# Patient Record
Sex: FEMALE | URBAN - METROPOLITAN AREA
[De-identification: names, ages, dates, MRNs, and addresses within clinical notes are randomized per-mention and may not be internally consistent; named-entity substitution may affect disease eponyms.]

---

## 2021-12-01 ENCOUNTER — HOSPITAL ENCOUNTER (EMERGENCY)
Facility: HOSPITAL | Age: 26
Discharge: LEFT BEFORE DISCHARGE | End: 2021-12-01

## 2022-06-05 ENCOUNTER — APPOINTMENT (OUTPATIENT)
Dept: GENERAL RADIOLOGY | Facility: HOSPITAL | Age: 27
End: 2022-06-05
Attending: EMERGENCY MEDICINE
Payer: MEDICAID

## 2022-06-05 PROCEDURE — 71045 X-RAY EXAM CHEST 1 VIEW: CPT | Performed by: EMERGENCY MEDICINE
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
## 2022-06-06 NOTE — ED NOTES
This writer received a phone call from [facility]. [place] [address] only received patients GenExpert PCR test; asked to refax labs; especially pregnancy test.    Packet is under review.

## 2022-06-06 NOTE — ED NOTES
Pt was assessed and recommended inpatient admission. Pt needs transfer out d/t her insurance is OON with Sakina Montemayor. Pt presented as ox2 (self and time). The petition was activated and paperwork was sent to the [place] [address]. Pt was provided copies.

## 2022-06-06 NOTE — ED QUICK NOTES
RN attempted to give RN to RN report at [facility]. Report not taken at this time d/t labs not being faxed.  [facility] notified
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
## 2022-06-06 NOTE — ED NOTES
This writer contacted Jenna of [facility] faxed referral over d/t them not having any available beds. Patients packet is under review.
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
## 2022-06-06 NOTE — ED NOTES
This writer received an incoming phone call from [address] [place] [address].  Patients accepted under Dr. Asher Sanchez. Transport to be set up during nurse to nurse at phone number [phone].

## 2022-06-06 NOTE — ED NOTES
This writer contacted [facility] regarding bed status. [facility] has beds but they will be used by patients in the Emergency department.     This writer was asked to call back tomorrow after 11:00 am.

## 2022-06-06 NOTE — ED QUICK NOTES
Consent obtained and completed for Medical Forensic Exam and Evidence Collection, Photographic Evidence, and Consent for Toxicology Testing.

## 2022-06-06 NOTE — ED NOTES
TRANSFER SUMMARY:  ANNA GONZALEZ:  Faxed packet for review. [facility]: Jignesh gonzalez will send packet over to [facility] or [facility].    NO AVAILABLE BEDS:  [facility]:  No available beds; try back tomorrow after 11:00 am.  [facility]:  No available beds. Call back around 1:00 pm for possible discharges. [facility]: No available beds; gave away their last women's; bed.
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
## 2022-06-06 NOTE — PROGRESS NOTES
06/05/22 9104   Advance Directives for Healthcare   Does the patient have:  None   Healthcare Agent Appointed No
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
## 2022-06-06 NOTE — ED QUICK NOTES
Patients parents came back to the room and report they are concerned about her drug use and would like her assessed by guilherme oak to see if she would need inpatient rehabe.

## 2022-06-06 NOTE — ED NOTES
This writer contacted [address] [place] [address] to confirm receipt of patients referral.  This writer confirmed that patients referral was sent.     Sinan Whitfield will look out for patients referral.
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
## 2022-06-06 NOTE — ED QUICK NOTES
Nurse to nurse report given to Suyapa King RN at [facility].  Per Suyapa King RN they will call back when they have an accepting time     Accepting MD -- Dr. Herlinda Lomeli

## 2022-06-06 NOTE — ED NOTES
This writer contacted [address] [place] [address] regarding status of patients referral.  [address] [place] [address] does not have any beds at this time. Patients packet was faxed over to [facility] for review. Packet is under review with [facility].

## 2022-10-09 ENCOUNTER — HOSPITAL ENCOUNTER (EMERGENCY)
Facility: HOSPITAL | Age: 27
Discharge: LEFT BEFORE DISCHARGE | End: 2022-10-09
Payer: MEDICAID

## 2022-10-09 VITALS
SYSTOLIC BLOOD PRESSURE: 108 MMHG | OXYGEN SATURATION: 98 % | DIASTOLIC BLOOD PRESSURE: 70 MMHG | HEART RATE: 97 BPM | TEMPERATURE: 98 F | RESPIRATION RATE: 16 BRPM

## 2022-10-09 NOTE — ED INITIAL ASSESSMENT (HPI)
Pt to the ER via walk in for rash all over body. Per patient she was at the  this morning and then for came home to see bumps all over her body. Rash throughout body. Pt complaints of itching.

## 2024-09-25 ENCOUNTER — APPOINTMENT (OUTPATIENT)
Dept: ULTRASOUND IMAGING | Facility: HOSPITAL | Age: 29
End: 2024-09-25
Attending: EMERGENCY MEDICINE
Payer: MEDICAID

## 2024-09-25 ENCOUNTER — HOSPITAL ENCOUNTER (EMERGENCY)
Facility: HOSPITAL | Age: 29
Discharge: HOME | End: 2024-09-25
Attending: EMERGENCY MEDICINE
Payer: MEDICAID

## 2024-09-25 ENCOUNTER — APPOINTMENT (OUTPATIENT)
Dept: GENERAL RADIOLOGY | Facility: HOSPITAL | Age: 29
End: 2024-09-25
Attending: EMERGENCY MEDICINE
Payer: MEDICAID

## 2024-09-25 VITALS
TEMPERATURE: 99 F | SYSTOLIC BLOOD PRESSURE: 110 MMHG | HEART RATE: 64 BPM | WEIGHT: 135 LBS | BODY MASS INDEX: 21 KG/M2 | DIASTOLIC BLOOD PRESSURE: 65 MMHG | RESPIRATION RATE: 16 BRPM | OXYGEN SATURATION: 100 %

## 2024-09-25 DIAGNOSIS — R06.00 DYSPNEA, UNSPECIFIED TYPE: Primary | ICD-10-CM

## 2024-09-25 LAB
ALBUMIN SERPL-MCNC: 4.1 G/DL (ref 3.2–4.8)
ALBUMIN/GLOB SERPL: 1.5 {RATIO} (ref 1–2)
ALP LIVER SERPL-CCNC: 59 U/L
ALT SERPL-CCNC: 22 U/L
ANION GAP SERPL CALC-SCNC: 6 MMOL/L (ref 0–18)
AST SERPL-CCNC: 18 U/L (ref ?–34)
BASOPHILS # BLD AUTO: 0.01 X10(3) UL (ref 0–0.2)
BASOPHILS NFR BLD AUTO: 0.2 %
BILIRUB SERPL-MCNC: 1.2 MG/DL (ref 0.3–1.2)
BUN BLD-MCNC: 10 MG/DL (ref 9–23)
CALCIUM BLD-MCNC: 9.5 MG/DL (ref 8.7–10.4)
CHLORIDE SERPL-SCNC: 108 MMOL/L (ref 98–112)
CO2 SERPL-SCNC: 20 MMOL/L (ref 21–32)
CREAT BLD-MCNC: 0.75 MG/DL
D DIMER PPP FEU-MCNC: 0.86 UG/ML FEU (ref ?–0.5)
EGFRCR SERPLBLD CKD-EPI 2021: 110 ML/MIN/1.73M2 (ref 60–?)
EOSINOPHIL # BLD AUTO: 0.03 X10(3) UL (ref 0–0.7)
EOSINOPHIL NFR BLD AUTO: 0.5 %
ERYTHROCYTE [DISTWIDTH] IN BLOOD BY AUTOMATED COUNT: 16.2 %
FLUAV + FLUBV RNA SPEC NAA+PROBE: NEGATIVE
FLUAV + FLUBV RNA SPEC NAA+PROBE: NEGATIVE
GLOBULIN PLAS-MCNC: 2.8 G/DL (ref 2–3.5)
GLUCOSE BLD-MCNC: 88 MG/DL (ref 70–99)
HCT VFR BLD AUTO: 32.7 %
HGB BLD-MCNC: 11.5 G/DL
IMM GRANULOCYTES # BLD AUTO: 0.02 X10(3) UL (ref 0–1)
IMM GRANULOCYTES NFR BLD: 0.3 %
LYMPHOCYTES # BLD AUTO: 2.75 X10(3) UL (ref 1–4)
LYMPHOCYTES NFR BLD AUTO: 43.7 %
MCH RBC QN AUTO: 28.7 PG (ref 26–34)
MCHC RBC AUTO-ENTMCNC: 35.2 G/DL (ref 31–37)
MCV RBC AUTO: 81.5 FL
MONOCYTES # BLD AUTO: 0.41 X10(3) UL (ref 0.1–1)
MONOCYTES NFR BLD AUTO: 6.5 %
NEUTROPHILS # BLD AUTO: 3.08 X10 (3) UL (ref 1.5–7.7)
NEUTROPHILS # BLD AUTO: 3.08 X10(3) UL (ref 1.5–7.7)
NEUTROPHILS NFR BLD AUTO: 48.8 %
OSMOLALITY SERPL CALC.SUM OF ELEC: 276 MOSM/KG (ref 275–295)
PLATELET # BLD AUTO: 294 10(3)UL (ref 150–450)
POTASSIUM SERPL-SCNC: 3.7 MMOL/L (ref 3.5–5.1)
PROT SERPL-MCNC: 6.9 G/DL (ref 5.7–8.2)
RBC # BLD AUTO: 4.01 X10(6)UL
RSV RNA SPEC NAA+PROBE: NEGATIVE
SARS-COV-2 RNA RESP QL NAA+PROBE: NOT DETECTED
SODIUM SERPL-SCNC: 134 MMOL/L (ref 136–145)
WBC # BLD AUTO: 6.3 X10(3) UL (ref 4–11)

## 2024-09-25 PROCEDURE — 85025 COMPLETE CBC W/AUTO DIFF WBC: CPT | Performed by: EMERGENCY MEDICINE

## 2024-09-25 PROCEDURE — 36415 COLL VENOUS BLD VENIPUNCTURE: CPT

## 2024-09-25 PROCEDURE — 0241U SARS-COV-2/FLU A AND B/RSV BY PCR (GENEXPERT): CPT | Performed by: EMERGENCY MEDICINE

## 2024-09-25 PROCEDURE — 85379 FIBRIN DEGRADATION QUANT: CPT | Performed by: EMERGENCY MEDICINE

## 2024-09-25 PROCEDURE — 80053 COMPREHEN METABOLIC PANEL: CPT | Performed by: EMERGENCY MEDICINE

## 2024-09-25 PROCEDURE — 93970 EXTREMITY STUDY: CPT | Performed by: EMERGENCY MEDICINE

## 2024-09-25 PROCEDURE — 99284 EMERGENCY DEPT VISIT MOD MDM: CPT

## 2024-09-25 PROCEDURE — 71045 X-RAY EXAM CHEST 1 VIEW: CPT | Performed by: EMERGENCY MEDICINE

## 2024-09-25 PROCEDURE — 99285 EMERGENCY DEPT VISIT HI MDM: CPT

## 2024-09-25 PROCEDURE — 94640 AIRWAY INHALATION TREATMENT: CPT

## 2024-09-25 RX ORDER — ALBUTEROL SULFATE 90 UG/1
2 INHALANT RESPIRATORY (INHALATION) EVERY 4 HOURS PRN
Qty: 1 EACH | Refills: 0 | Status: SHIPPED | OUTPATIENT
Start: 2024-09-25 | End: 2024-10-25

## 2024-09-25 RX ORDER — IPRATROPIUM BROMIDE AND ALBUTEROL SULFATE 2.5; .5 MG/3ML; MG/3ML
3 SOLUTION RESPIRATORY (INHALATION) ONCE
Status: COMPLETED | OUTPATIENT
Start: 2024-09-25 | End: 2024-09-25

## 2024-09-25 NOTE — ED PROVIDER NOTES
Patient Seen in: [facility] Emergency Department      History     Chief Complaint   Patient presents with    Shortness Of Breath     SOB 7 weeks, worse this week. 6 weeks pregnant       Stated Complaint:     Subjective:   HPI    29-year-old female complaint of shortness of breath.  She says been going on for a few weeks seems to be worse this week she just found out she was pregnant a few days ago she is about 6 weeks pregnant last menstrual period was August 11.  The shortness of breath initially was just with exertion now at rest at times but worsens with exertion and sometimes being outside makes a difference.  She denies any cough cold symptoms fever heart problems history of asthma or swelling in her legs no history of pulmonary embolus or DVT.  She did feel have some wheezing after COVID in the past and was treated with albuterol is seems somewhat similar to then.  Patient does have some tingling around her lips at times.    Objective:   History reviewed. No pertinent past medical history.           History reviewed. No pertinent surgical history.             Social History     Socioeconomic History    Marital status: Single   Tobacco Use    Smoking status: Former     Types: Cigarettes    Smokeless tobacco: Never   Vaping Use    Vaping status: Some Days   Substance and Sexual Activity    Alcohol use: Not Currently    Drug use: Not Currently     Types: Cannabis              Review of Systems    Positive for stated Chief Complaint: Shortness Of Breath (SOB 7 weeks, worse this week. 6 weeks pregnant/)    Other systems are as noted in HPI.  Constitutional and vital signs reviewed.      All other systems reviewed and negative except as noted above.    Physical Exam     ED Triage Vitals [09/25/24 1831]   /69   Pulse 97   Resp 22   Temp 98.9 °F (37.2 °C)   Temp src Oral   SpO2 99 %   O2 Device None (Room air)       Current Vitals:   Vital Signs  BP: 106/63  Pulse: 67  Resp: 13  Temp: 98.9 °F (37.2  °C)  Temp src: Oral  MAP (mmHg): 76    Oxygen Therapy  SpO2: 100 %  O2 Device: None (Room air)            Physical Exam    Patient is alert orient x 3 no acute distress HEENT exam within normal limits neck there is no lymphadenopathy or JVD lungs clear cardiovascular exam shows regular rate and rhythm without murmurs abdomen soft and nontender skin is no rash neurologic Patrick is normal.    ED Course     Labs Reviewed   COMP METABOLIC PANEL (14) - Abnormal; Notable for the following components:       Result Value    Sodium 134 (*)     CO2 20.0 (*)     All other components within normal limits   CBC WITH DIFFERENTIAL WITH PLATELET - Abnormal; Notable for the following components:    HGB 11.5 (*)     HCT 32.7 (*)     All other components within normal limits   D-DIMER - Abnormal; Notable for the following components:    D-Dimer 0.86 (*)     All other components within normal limits   SARS-COV-2/FLU A AND B/RSV BY PCR (GENEXPERT) - Normal    Narrative:     This test is intended for the qualitative detection and differentiation of SARS-CoV-2, influenza A, influenza B, and respiratory syncytial virus (RSV) viral RNA in nasopharyngeal or nares swabs from individuals suspected of respiratory viral infection consistent with COVID-19 by their healthcare provider. Signs and symptoms of respiratory viral infection due to SARS-CoV-2, influenza, and RSV can be similar.    Test performed using the Xpert Xpress SARS-CoV-2/FLU/RSV (real time RT-PCR)  assay on the GeneXpert instrument, [employer], [place] [address].   This test is being used under the Food and Drug Administration's Emergency Use Authorization.    The authorized Fact Sheet for Healthcare Providers for this assay is available upon request from the laboratory.   RAINBOW DRAW LAVENDER   RAINBOW DRAW LIGHT GREEN   RAINBOW DRAW GOLD   RAINBOW DRAW BLUE             Abnormal Labs Reviewed   COMP METABOLIC PANEL (14) - Abnormal; Notable for the following components:       Result  Value    Sodium 134 (*)     CO2 20.0 (*)     All other components within normal limits   CBC WITH DIFFERENTIAL WITH PLATELET - Abnormal; Notable for the following components:    HGB 11.5 (*)     HCT 32.7 (*)     All other components within normal limits   D-DIMER - Abnormal; Notable for the following components:    D-Dimer 0.86 (*)     All other components within normal limits     D-dimer is elevated however 1 reference states this is normal for first trimester pregnancy.  XR CHEST AP PORTABLE  (CPT=71045)    Result Date: 9/25/2024  CONCLUSION:  No active disease seen.   LOCATION:  [id number]      Dictated by (CST): Abhay Pena MD on 9/25/2024 at 7:26 PM     Finalized by (CST): Abhay Pena MD on 9/25/2024 at 7:26 PM      Images independent reviewed there is no pneumonia.     Venous Doppler ultrasound was negative for DVT images independent reviewed.  I  MDM    initial differential diagnosis includes but not limited to  Asthma bronchitis pneumonia anxiety pulmonary embolus      Patient felt better after neb treatment I did not hear any clear wheezing but she did not appear dyspneic unclear if this is some bronchoconstriction or perhaps anxiety could be part of this I do not see evidence of pneumonia CHF or pulmonary embolus she appears well to be discharged her O2 sat was 100% and vital signs are stable.                             Medical Decision Making      Disposition and Plan     Clinical Impression:  1. Dyspnea, unspecified type         Disposition:  Discharge  9/25/2024  9:48 pm    Follow-up:  Kelsey Devlin  [address]  [address] [place] [address] [phone]    Follow up in 3 day(s)            Medications Prescribed:  Current Discharge Medication List        START taking these medications    Details   albuterol 108 (90 Base) MCG/ACT Inhalation Aero Soln Inhale 2 puffs into the lungs every 4 (four) hours as needed for Wheezing.  Qty: 1 each, Refills: 0

## 2024-09-25 NOTE — ED INITIAL ASSESSMENT (HPI)
Patient presenting with SOB that started a few weeks ago that is getting worse, at the beginning SOB was with activity but last few days is all the time. Patient is 7 weeks pregnant.

## 2024-09-26 NOTE — DISCHARGE INSTRUCTIONS
Use inhaler 2 puffs every 4 hours as needed return for worsening symptoms recheck with your doctor for next 2 days.